# Patient Record
(demographics unavailable — no encounter records)

---

## 2024-10-10 NOTE — DISCUSSION/SUMMARY
[GA at Birth: ___] : GA at Birth: [unfilled] [Chronological Age: ___] : Chronological Age: [unfilled] [Alert] : alert [Irritable] : irritable [Vocalizes] : vocalizes [Consolable] : consolable [Quiet] : quiet [Turns head to both sides (0-2 months)] : turns head to both sides (0-2 months) [Moves extremities equally] : moves extremities equally [Moves against gravity] : moves against gravity [Turns head side to side] : turns head side to side [Active] : sidelying to supine (1.5 - 2 months) - Active [Passive] : prone to supine (2- 5 months) - Passive [Lag] : Head lag (0-2 months) - lag [Poor] : head control is poor [<] : < [Organized] : organized [Good] : good [Focusing (2 months)] : focusing (2 months) [Visual attention] : visual attention [] : no [Sleeps well] : sleeps well [Prone] : prone [Developmental Suggestions] : developmental suggestions handout [FreeTextEntry1] : ANA [FreeTextEntry2] : overall development [FreeTextEntry5] : prefers L rotation however able to rotate R [FreeTextEntry6] : decreased shoulder girdle strength, decreased midline orientation, hands fisted [FreeTextEntry3] : Dev Handout given to parents for supine, prone, sidelying, swaddle, vestibular. Instructed parents on importance of swaddling in flexed, midline posture. Educated parents on proper prone position and schedule as well as visual motor exercises. Demonstrates age appropriate state regulation skills. PT recommended due to head lag, low truncal tone, fixing (hands fisted). Follow up c  clinic.

## 2024-10-10 NOTE — REASON FOR VISIT
[Weight Check] : weight check [Developmental Delay] : developmental delay [Medical Records] : medical records [Parents] : parents [F/U - Hospitalization] : follow-up of a recent hospitalization for [Mother] : mother

## 2024-10-10 NOTE — REVIEW OF SYSTEMS
[Nl] : Allergy/Immunology [Immunizations are up to date] : Immunizations are up to date [RSV prophylaxis received] : RSV prophylaxis received

## 2024-10-10 NOTE — REVIEW OF SYSTEMS
No respiratory distress. No stridor, Lungs sounds clear with good aeration bilaterally. [Nl] : Allergy/Immunology [Immunizations are up to date] : Immunizations are up to date [RSV prophylaxis received] : RSV prophylaxis received

## 2024-10-10 NOTE — DISCUSSION/SUMMARY
[GA at Birth: ___] : GA at Birth: [unfilled] [Chronological Age: ___] : Chronological Age: [unfilled] [Alert] : alert [Irritable] : irritable [Vocalizes] : vocalizes [Consolable] : consolable [Quiet] : quiet [Turns head to both sides (0-2 months)] : turns head to both sides (0-2 months) [Moves extremities equally] : moves extremities equally [Moves against gravity] : moves against gravity [Turns head side to side] : turns head side to side [Active] : sidelying to supine (1.5 - 2 months) - Active [Passive] : prone to supine (2- 5 months) - Passive [Lag] : Head lag (0-2 months) - lag [Poor] : head control is poor [<] : < [Organized] : organized [Good] : good [Focusing (2 months)] : focusing (2 months) [Visual attention] : visual attention [] : yes [Sleeps well] : sleeps well [Prone] : prone [Developmental Suggestions] : developmental suggestions handout [FreeTextEntry1] : ANA [FreeTextEntry2] : overall development [FreeTextEntry5] : prefers L rotation however able to rotate R [FreeTextEntry6] : decreased shoulder girdle strength, decreased midline orientation, hands fisted [FreeTextEntry3] : Dev Handout given to parents for supine, prone, sidelying, swaddle, vestibular. Instructed parents on importance of swaddling in flexed, midline posture. Educated parents on proper prone position and schedule as well as visual motor exercises. Demonstrates age appropriate state regulation skills. PT recommended due to head lag, low truncal tone, fixing (hands fisted). Follow up c  clinic.

## 2024-10-11 NOTE — PHYSICAL EXAM
[Well-appearing] : well-appearing [Normocephalic] : normocephalic [Anterior fontanel- Open] : anterior fontanel- open [Anterior fontanel- Soft] : anterior fontanel- soft [Anterior fontanel- Flat] : anterior fontanel- flat [No dysmorphic facial features] : no dysmorphic facial features [No ocular abnormalities] : no ocular abnormalities [Neck supple] : neck supple [Soft] : soft [No organomegaly] : no organomegaly [No deformities] : no deformities [Alert] : alert [Regards] : regards [Smiling] : smiling [Pupils reactive to light] : pupils reactive to light [Turns to light] : turns to light [Tracks face, light or objects with full extraocular movements] : tracks face, light or objects with full extraocular movements [No facial asymmetry or weakness] : no facial asymmetry or weakness [No nystagmus] : no nystagmus [Responds to voice/sounds] : responds to voice/sounds [Midline tongue] : midline tongue [No fasciculations] : no fasciculations [Normal axial and appendicular muscle tone with symmetric limb movements] : normal axial and appendicular muscle tone with symmetric limb movements [Normal bulk] : normal bulk [No abnormal involuntary movements] : no abnormal involuntary movements [2+ biceps] : 2+ biceps [Knee jerks] : knee jerks [Ankle jerks] : ankle jerks [No ankle clonus] : no ankle clonus [Responds to touch and tickle] : responds to touch and tickle

## 2024-10-13 NOTE — PATIENT INSTRUCTIONS
[Verbal patient instructions provided] : Verbal patient instructions provided. [FreeTextEntry1] : Developmental Clinic appt     2/19/25     phone: [phone] Peds Neurology 10/11/24 Next NICU Grad Clinic appt 12/12/24 @ 8:45 [FreeTextEntry2] : Evaluated by PT. Exercises and positioning reviewed and tummy time reinforced.  Outpatient PT referral sent [FreeTextEntry3] : not needed at this time [FreeTextEntry4] : Continue Breastfeeding/expressed breast milk on demand [FreeTextEntry5] : Continue vitamin D [FreeTextEntry6] : n/a [FreeTextEntry7] : n/a [FreeTextEntry8] : per PMD [de-identified] : Eligible for beyfortus Fall 2024, please discuss with PMD. [FreeTextEntry9] : n/a [de-identified] : skin care instructions reviewed with caregiver, aquaphor to skin, avoid direct sun exposure [de-identified] : none [de-identified] : none

## 2024-10-13 NOTE — HISTORY OF PRESENT ILLNESS
[Gestational Age: ___] : Gestational Age: [unfilled] [Chronological Age: ___] : Chronological Age: [unfilled] [Date of D/C: ___] : Date of D/C: [unfilled] [Developmental Pediatrics: ___] : Developmental Pediatrics: [unfilled] [Car seat use according to directions] : car seat used according to directions [No Feeding Issues] : no feeding issues at this time [___ minutes/feeding] : [unfilled] minutes/feeding [Other ___] : [unfilled] [___ yovana/ounce] : [unfilled] yovana/ounce [___ ounces/feeding] : ~LEANNA buck/feeding [Every ___ hours] : every [unfilled] hours [_____ Times Per] : Stool frequency occurs [unfilled] times per  [Day] : day [Soft] : soft [Solid Foods] : no solid food at this time [Bloody] : not bloody [Mucousy] : no mucous [de-identified] :  High Risk & Developmental follow up [de-identified] : none [de-identified] : Peds Neuro 10/11/24 [FreeTextEntry3] : with supplementation of WGM8 [de-identified] : Sleeps supine in own bed.  Wakes to feed every 3-4 hrs [de-identified] : n/a

## 2024-10-13 NOTE — HISTORY OF PRESENT ILLNESS
[Gestational Age: ___] : Gestational Age: [unfilled] [Chronological Age: ___] : Chronological Age: [unfilled] [Date of D/C: ___] : Date of D/C: [unfilled] [Developmental Pediatrics: ___] : Developmental Pediatrics: [unfilled] [Car seat use according to directions] : car seat used according to directions [No Feeding Issues] : no feeding issues at this time [___ minutes/feeding] : [unfilled] minutes/feeding [Other ___] : [unfilled] [___ yovana/ounce] : [unfilled] yovana/ounce [___ ounces/feeding] : ~LEANNA buck/feeding [Every ___ hours] : every [unfilled] hours [_____ Times Per] : Stool frequency occurs [unfilled] times per  [Day] : day [Soft] : soft [Solid Foods] : no solid food at this time [Bloody] : not bloody [Mucousy] : no mucous [de-identified] :  High Risk & Developmental follow up [de-identified] : none [de-identified] : Peds Neuro 10/11/24 [FreeTextEntry3] : with supplementation of WGM8 [de-identified] : Sleeps supine in own bed.  Wakes to feed every 3-4 hrs [de-identified] : n/a

## 2024-10-13 NOTE — CONSULT LETTER
[Dear  ___] : Dear  [unfilled], [Courtesy Letter:] : I had the pleasure of seeing your patient, [unfilled], in my office today. [Please see my note below.] : Please see my note below. [Sincerely,] : Sincerely, [FreeTextEntry3] : Bertha Cee MD Attending Neonatologist [facility]

## 2024-10-13 NOTE — HISTORY OF PRESENT ILLNESS
[Gestational Age: ___] : Gestational Age: [unfilled] [Chronological Age: ___] : Chronological Age: [unfilled] [Date of D/C: ___] : Date of D/C: [unfilled] [Developmental Pediatrics: ___] : Developmental Pediatrics: [unfilled] [Car seat use according to directions] : car seat used according to directions [No Feeding Issues] : no feeding issues at this time [___ minutes/feeding] : [unfilled] minutes/feeding [Other ___] : [unfilled] [___ yovana/ounce] : [unfilled] yovana/ounce [___ ounces/feeding] : ~LEANNA buck/feeding [Every ___ hours] : every [unfilled] hours [_____ Times Per] : Stool frequency occurs [unfilled] times per  [Day] : day [Soft] : soft [Solid Foods] : no solid food at this time [Bloody] : not bloody [Mucousy] : no mucous [de-identified] :  High Risk & Developmental follow up [de-identified] : none [de-identified] : Peds Neuro 10/11/24 [FreeTextEntry3] : with supplementation of WGM8 [de-identified] : Sleeps supine in own bed.  Wakes to feed every 3-4 hrs [de-identified] : n/a

## 2024-10-13 NOTE — PHYSICAL EXAM
[Pink] : pink [Well Perfused] : well perfused [No Rashes] : no rashes [No Birth Marks] : no birth marks [Conjunctiva Clear] : conjunctiva clear [PERRL] : pupils were equal, round, reactive to light  [Ears Normal Position and Shape] : normal position and shape of ears [Nares Patent] : nares patent [No Nasal Flaring] : no nasal flaring [Moist and Pink Mucous Membranes] : moist and pink mucous membranes [Palate Intact] : palate intact [No Torticollis] : no torticollis [Symmetric Expansion] : symmetric chest expansion [No Retractions] : no retractions [Clear to Auscultation] : lungs clear to auscultation  [Normal S1, S2] : normal S1 and S2 [Regular Rhythm] : regular rhythm [No Murmur] : no mumur [Normal Pulses] : normal pulses [Non Distended] : non distended [No HSM] : no hepatosplenomegaly appreciated [No Masses] : no masses were palpated [Normal Bowel Sounds] : normal bowel sounds [No Umbilical Hernia] : no umbilical hernia [Normal Genitalia] : normal genitalia [No Sacral Dimples] : no sacral dimples [No Scoliosis] : no scoliosis [Normal Range of Motion] : normal range of motion [Normal Posture] : normal posture [No evidence of Hip Dislocation] : no evidence of hip dislocation [Active and Alert] : active and alert [Normal deep tendon reflexes] : normal deep tendon reflexes [Strong Suck] : the strong sucking reflex was ~L present [Fixes On Faces] : fixes on faces [Follows 180 Degrees] : visual track 180 degrees [Smiles Sociallly] : has a social smile [[place]] : coos [Turns Head Side to Side in Prone] : turns head side to side in prone [Lifts Head And Chest 45 degress in Prone] : lifts the head and chest 45 degress in prone [Weight Shifts in Prone] : weight shifts in prone [Rolls Front to Back] : rolls front to back [Reaches for Objects] : reaches for objects [Brings Hands to Mouth] : brings hands to mouth [Brings Hands to Midline] : brings hands to midline [FreeTextEntry3] : tiny palpable but movable lymph nodes in neck [de-identified] : mild hypertonia of shoulders and upper arms and + head lag Mild hyportonia of trunk

## 2024-10-13 NOTE — BIRTH HISTORY
[de-identified] : Baby is a 39.2 wk female born to a 35 y/o  mother via unscheduled emergent C/S for fetal bradycardia for ~ 10 min with HR dropping to 90's with intermittent recovery but not sustained during pushing and elevated station of 0. Maternal history significant for chronic HTN on labetalol and amlodipine for which she was undergoing IOL. Prenatal history uncomplicated. Maternal blood type B+. PNL: HIV neg/RPR NR/HBsAg neg/rubella immune. GBS positive on , inadequate treatment with Cefazolin. AROM at 12:50 on , light mec fluids. ROM duration 16 HRS. Maternal temp 37.1C. EOS: 0.32. Baby born vertex, immediate cord clamping, poor tone, blue, apneic. Warmed, dried, stimulated without improvement and PPV started at 1 min of life. HR<60 at 1 MOL. PIP increased to 30 due to poor chest rise, PEEP of 6 up to 100% FiO2. HR improved 100 by 3 MOL, however due to poor respiratory effort, infant was intubated with 3.5 ETT by NICU fellow Dr. Coburn on the first attempt. Equal but coarse breath sounds noted. PIP weaned to 25 and FiO2 to 40% prior to leaving OR. Apgars 1/2/6/8.  [de-identified] : FT, AGA, respiratory failure, need for observation for sepsis, metabolic acidosis, and  encephalopathy

## 2024-10-13 NOTE — PHYSICAL EXAM
[Pink] : pink [Well Perfused] : well perfused [No Rashes] : no rashes [No Birth Marks] : no birth marks [Conjunctiva Clear] : conjunctiva clear [PERRL] : pupils were equal, round, reactive to light  [Ears Normal Position and Shape] : normal position and shape of ears [Nares Patent] : nares patent [No Nasal Flaring] : no nasal flaring [Moist and Pink Mucous Membranes] : moist and pink mucous membranes [Palate Intact] : palate intact [No Torticollis] : no torticollis [Symmetric Expansion] : symmetric chest expansion [No Retractions] : no retractions [Clear to Auscultation] : lungs clear to auscultation  [Normal S1, S2] : normal S1 and S2 [Regular Rhythm] : regular rhythm [No Murmur] : no mumur [Normal Pulses] : normal pulses [Non Distended] : non distended [No HSM] : no hepatosplenomegaly appreciated [No Masses] : no masses were palpated [Normal Bowel Sounds] : normal bowel sounds [No Umbilical Hernia] : no umbilical hernia [Normal Genitalia] : normal genitalia [No Sacral Dimples] : no sacral dimples [No Scoliosis] : no scoliosis [Normal Range of Motion] : normal range of motion [Normal Posture] : normal posture [No evidence of Hip Dislocation] : no evidence of hip dislocation [Active and Alert] : active and alert [Normal deep tendon reflexes] : normal deep tendon reflexes [Strong Suck] : the strong sucking reflex was ~L present [Fixes On Faces] : fixes on faces [Follows 180 Degrees] : visual track 180 degrees [Smiles Sociallly] : has a social smile [[place]] : coos [Turns Head Side to Side in Prone] : turns head side to side in prone [Lifts Head And Chest 45 degress in Prone] : lifts the head and chest 45 degress in prone [Weight Shifts in Prone] : weight shifts in prone [Rolls Front to Back] : rolls front to back [Reaches for Objects] : reaches for objects [Brings Hands to Mouth] : brings hands to mouth [Brings Hands to Midline] : brings hands to midline [FreeTextEntry3] : tiny palpable but movable lymph nodes in neck [de-identified] : mild hypertonia of shoulders and upper arms and + head lag Mild hyportonia of trunk

## 2024-10-13 NOTE — BIRTH HISTORY
[de-identified] : Baby is a 39.2 wk female born to a 33 y/o  mother via unscheduled emergent C/S for fetal bradycardia for ~ 10 min with HR dropping to 90's with intermittent recovery but not sustained during pushing and elevated station of 0. Maternal history significant for chronic HTN on labetalol and amlodipine for which she was undergoing IOL. Prenatal history uncomplicated. Maternal blood type B+. PNL: HIV neg/RPR NR/HBsAg neg/rubella immune. GBS positive on , inadequate treatment with Cefazolin. AROM at 12:50 on , light mec fluids. ROM duration 16 HRS. Maternal temp 37.1C. EOS: 0.32. Baby born vertex, immediate cord clamping, poor tone, blue, apneic. Warmed, dried, stimulated without improvement and PPV started at 1 min of life. HR<60 at 1 MOL. PIP increased to 30 due to poor chest rise, PEEP of 6 up to 100% FiO2. HR improved 100 by 3 MOL, however due to poor respiratory effort, infant was intubated with 3.5 ETT by NICU fellow Dr. Coburn on the first attempt. Equal but coarse breath sounds noted. PIP weaned to 25 and FiO2 to 40% prior to leaving OR. Apgars 1/2/6/8.  [de-identified] : FT, AGA, respiratory failure, need for observation for sepsis, metabolic acidosis, and  encephalopathy

## 2024-10-13 NOTE — PATIENT INSTRUCTIONS
[Verbal patient instructions provided] : Verbal patient instructions provided. [FreeTextEntry1] : Developmental Clinic appt     2/19/25     phone: [phone] Peds Neurology 10/11/24 Next NICU Grad Clinic appt 12/12/24 @ 8:45 [FreeTextEntry2] : Evaluated by PT. Exercises and positioning reviewed and tummy time reinforced.  Outpatient PT referral sent [FreeTextEntry3] : not needed at this time [FreeTextEntry4] : Continue Breastfeeding/expressed breast milk on demand [FreeTextEntry5] : Continue vitamin D [FreeTextEntry6] : n/a [FreeTextEntry7] : n/a [FreeTextEntry8] : per PMD [de-identified] : Eligible for beyfortus Fall 2024, please discuss with PMD. [FreeTextEntry9] : n/a [de-identified] : skin care instructions reviewed with caregiver, aquaphor to skin, avoid direct sun exposure [de-identified] : none [de-identified] : none

## 2024-10-13 NOTE — CONSULT LETTER
[Dear  ___] : Dear  [unfilled], [Courtesy Letter:] : I had the pleasure of seeing your patient, [unfilled], in my office today. [Please see my note below.] : Please see my note below. [Sincerely,] : Sincerely, [FreeTextEntry3] : Bertha Cee MD Attending Neonatologist [facility]

## 2024-10-13 NOTE — HISTORY OF PRESENT ILLNESS
[Gestational Age: ___] : Gestational Age: [unfilled] [Chronological Age: ___] : Chronological Age: [unfilled] [Date of D/C: ___] : Date of D/C: [unfilled] [Developmental Pediatrics: ___] : Developmental Pediatrics: [unfilled] [Car seat use according to directions] : car seat used according to directions [No Feeding Issues] : no feeding issues at this time [___ minutes/feeding] : [unfilled] minutes/feeding [Other ___] : [unfilled] [___ yovana/ounce] : [unfilled] yovana/ounce [___ ounces/feeding] : ~LEANNA buck/feeding [Every ___ hours] : every [unfilled] hours [_____ Times Per] : Stool frequency occurs [unfilled] times per  [Day] : day [Soft] : soft [Solid Foods] : no solid food at this time [Bloody] : not bloody [Mucousy] : no mucous [de-identified] :  High Risk & Developmental follow up [de-identified] : none [de-identified] : Peds Neuro 10/11/24 [FreeTextEntry3] : with supplementation of WGM8 [de-identified] : Sleeps supine in own bed.  Wakes to feed every 3-4 hrs [de-identified] : n/a

## 2024-10-13 NOTE — PHYSICAL EXAM
[Pink] : pink [Well Perfused] : well perfused [No Rashes] : no rashes [No Birth Marks] : no birth marks [Conjunctiva Clear] : conjunctiva clear [PERRL] : pupils were equal, round, reactive to light  [Ears Normal Position and Shape] : normal position and shape of ears [Nares Patent] : nares patent [No Nasal Flaring] : no nasal flaring [Moist and Pink Mucous Membranes] : moist and pink mucous membranes [Palate Intact] : palate intact [No Torticollis] : no torticollis [Symmetric Expansion] : symmetric chest expansion [No Retractions] : no retractions [Clear to Auscultation] : lungs clear to auscultation  [Normal S1, S2] : normal S1 and S2 [Regular Rhythm] : regular rhythm [No Murmur] : no mumur [Normal Pulses] : normal pulses [Non Distended] : non distended [No HSM] : no hepatosplenomegaly appreciated [No Masses] : no masses were palpated [Normal Bowel Sounds] : normal bowel sounds [No Umbilical Hernia] : no umbilical hernia [Normal Genitalia] : normal genitalia [No Sacral Dimples] : no sacral dimples [No Scoliosis] : no scoliosis [Normal Range of Motion] : normal range of motion [Normal Posture] : normal posture [No evidence of Hip Dislocation] : no evidence of hip dislocation [Active and Alert] : active and alert [Normal deep tendon reflexes] : normal deep tendon reflexes [Strong Suck] : the strong sucking reflex was ~L present [Fixes On Faces] : fixes on faces [Follows 180 Degrees] : visual track 180 degrees [Smiles Sociallly] : has a social smile [[place]] : coos [Turns Head Side to Side in Prone] : turns head side to side in prone [Lifts Head And Chest 45 degress in Prone] : lifts the head and chest 45 degress in prone [Weight Shifts in Prone] : weight shifts in prone [Rolls Front to Back] : rolls front to back [Reaches for Objects] : reaches for objects [Brings Hands to Mouth] : brings hands to mouth [Brings Hands to Midline] : brings hands to midline [FreeTextEntry3] : tiny palpable but movable lymph nodes in neck [de-identified] : mild hypertonia of shoulders and upper arms and + head lag Mild hyportonia of trunk

## 2024-10-13 NOTE — CONSULT LETTER
[Dear  ___] : Dear  [unfilled], [Courtesy Letter:] : I had the pleasure of seeing your patient, [unfilled], in my office today. [Please see my note below.] : Please see my note below. [Sincerely,] : Sincerely, [FreeTextEntry3] : Bertha Cee MD Attending Neonatologist [facility]

## 2024-10-13 NOTE — PATIENT INSTRUCTIONS
[Verbal patient instructions provided] : Verbal patient instructions provided. [FreeTextEntry1] : Developmental Clinic appt     2/19/25     phone: [phone] Peds Neurology 10/11/24 Next NICU Grad Clinic appt 12/12/24 @ 8:45 [FreeTextEntry2] : Evaluated by PT. Exercises and positioning reviewed and tummy time reinforced.  Outpatient PT referral sent [FreeTextEntry3] : not needed at this time [FreeTextEntry4] : Continue Breastfeeding/expressed breast milk on demand [FreeTextEntry5] : Continue vitamin D [FreeTextEntry6] : n/a [FreeTextEntry7] : n/a [FreeTextEntry8] : per PMD [de-identified] : Eligible for beyfortus Fall 2024, please discuss with PMD. [FreeTextEntry9] : n/a [de-identified] : skin care instructions reviewed with caregiver, aquaphor to skin, avoid direct sun exposure [de-identified] : none [de-identified] : none

## 2024-10-13 NOTE — END OF VISIT
[Time Spent: ___ minutes] : I have spent [unfilled] minutes of time on the encounter which excludes teaching and separately reported services. DISPLAY PLAN FREE TEXT

## 2024-10-13 NOTE — CONSULT LETTER
[Dear  ___] : Dear  [unfilled], [Courtesy Letter:] : I had the pleasure of seeing your patient, [unfilled], in my office today. [Please see my note below.] : Please see my note below. [Sincerely,] : Sincerely, [FreeTextEntry3] : Bertha Cee MD Attending Neonatologist [facility]

## 2024-10-13 NOTE — ASSESSMENT
[FreeTextEntry1] : ALLEN ANTONIO  is a 39.2 week gestation infant, now chronologic age 6 weeks seen in  follow-up. Pertinent NICU history includes Term infant, HIE S/P Therapeutic Hypothermia, Respiratory Failure, Metabolic Acidosis, Presumed Sepsis  The following issues were addressed at this visit.  Growth and nutrition: Weight gain has been 32 g.day and plots at the 31st percentile for corrected age.  Head growth and length are at the 56th and 13th percentiles respectively.  Baby is currently directly breastfeeding or receiving EHM  and the plan is to continue until the baby transitions to whole milk at ~ 1 year. Solid foods are not recommended until baby has good head control. .No labs needed today.. Continue vitamin supplements.  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  During her NICU stay her EEG showed mild-mod slowing of background activity but no sz.  Her MRI showed patchy areas of recent ischemia involving the inferior parietal periventricular white matter.  Will follow up Peds Neurology on 10/11. The baby has developmental delay for chronologic age, was seen by PT/OT today and given home exercises to do. Baby also has hypertonia of shoulders and UE, head lag, and mild hypotonia of the trunk. Early Intervention was not recommended but a referral was made for outpatient PT.  Baby will follow-up with pediatric developmental on 24 and will be seen back in the NICU Grad Clinic in 2 months.  Anemia: Baby has not needed iron supplements. Hct reviewed and is appropriate for age.  CLD: Infant has no sx of chronic lung disease. O2 sats 100% in RA. The baby received RSV prophylaxis by PMD on 10/9/.24.  JUAN: Baby has no signs of JUAN.  ROP: Baby is not at risk for ROP.  Other:   Health maintenance: Reviewed routine vaccination schedule with parent as well as guidance for flu vaccine for family, COVID-19 precautions, and need for PMD f/u.  Also discussed bathing and skin care recommendations.   Reviewed Hospital notes and Discharge Summary.   Next neonatology f/u: 25 @ 0845.

## 2024-10-13 NOTE — BIRTH HISTORY
[de-identified] : Baby is a 39.2 wk female born to a 33 y/o  mother via unscheduled emergent C/S for fetal bradycardia for ~ 10 min with HR dropping to 90's with intermittent recovery but not sustained during pushing and elevated station of 0. Maternal history significant for chronic HTN on labetalol and amlodipine for which she was undergoing IOL. Prenatal history uncomplicated. Maternal blood type B+. PNL: HIV neg/RPR NR/HBsAg neg/rubella immune. GBS positive on , inadequate treatment with Cefazolin. AROM at 12:50 on , light mec fluids. ROM duration 16 HRS. Maternal temp 37.1C. EOS: 0.32. Baby born vertex, immediate cord clamping, poor tone, blue, apneic. Warmed, dried, stimulated without improvement and PPV started at 1 min of life. HR<60 at 1 MOL. PIP increased to 30 due to poor chest rise, PEEP of 6 up to 100% FiO2. HR improved 100 by 3 MOL, however due to poor respiratory effort, infant was intubated with 3.5 ETT by NICU fellow Dr. Coburn on the first attempt. Equal but coarse breath sounds noted. PIP weaned to 25 and FiO2 to 40% prior to leaving OR. Apgars 1/2/6/8.  [de-identified] : FT, AGA, respiratory failure, need for observation for sepsis, metabolic acidosis, and  encephalopathy

## 2024-10-13 NOTE — PHYSICAL EXAM
[Pink] : pink [Well Perfused] : well perfused [No Rashes] : no rashes [No Birth Marks] : no birth marks [Conjunctiva Clear] : conjunctiva clear [PERRL] : pupils were equal, round, reactive to light  [Ears Normal Position and Shape] : normal position and shape of ears [Nares Patent] : nares patent [No Nasal Flaring] : no nasal flaring [Moist and Pink Mucous Membranes] : moist and pink mucous membranes [Palate Intact] : palate intact [No Torticollis] : no torticollis [Symmetric Expansion] : symmetric chest expansion [No Retractions] : no retractions [Clear to Auscultation] : lungs clear to auscultation  [Normal S1, S2] : normal S1 and S2 [Regular Rhythm] : regular rhythm [No Murmur] : no mumur [Normal Pulses] : normal pulses [Non Distended] : non distended [No HSM] : no hepatosplenomegaly appreciated [No Masses] : no masses were palpated [Normal Bowel Sounds] : normal bowel sounds [No Umbilical Hernia] : no umbilical hernia [Normal Genitalia] : normal genitalia [No Sacral Dimples] : no sacral dimples [No Scoliosis] : no scoliosis [Normal Range of Motion] : normal range of motion [Normal Posture] : normal posture [No evidence of Hip Dislocation] : no evidence of hip dislocation [Active and Alert] : active and alert [Normal deep tendon reflexes] : normal deep tendon reflexes [Strong Suck] : the strong sucking reflex was ~L present [Fixes On Faces] : fixes on faces [Follows 180 Degrees] : visual track 180 degrees [Smiles Sociallly] : has a social smile [[place]] : coos [Turns Head Side to Side in Prone] : turns head side to side in prone [Lifts Head And Chest 45 degress in Prone] : lifts the head and chest 45 degress in prone [Weight Shifts in Prone] : weight shifts in prone [Rolls Front to Back] : rolls front to back [Reaches for Objects] : reaches for objects [Brings Hands to Mouth] : brings hands to mouth [Brings Hands to Midline] : brings hands to midline [FreeTextEntry3] : tiny palpable but movable lymph nodes in neck [de-identified] : mild hypertonia of shoulders and upper arms and + head lag Mild hyportonia of trunk

## 2024-10-13 NOTE — BIRTH HISTORY
[de-identified] : Baby is a 39.2 wk female born to a 33 y/o  mother via unscheduled emergent C/S for fetal bradycardia for ~ 10 min with HR dropping to 90's with intermittent recovery but not sustained during pushing and elevated station of 0. Maternal history significant for chronic HTN on labetalol and amlodipine for which she was undergoing IOL. Prenatal history uncomplicated. Maternal blood type B+. PNL: HIV neg/RPR NR/HBsAg neg/rubella immune. GBS positive on , inadequate treatment with Cefazolin. AROM at 12:50 on , light mec fluids. ROM duration 16 HRS. Maternal temp 37.1C. EOS: 0.32. Baby born vertex, immediate cord clamping, poor tone, blue, apneic. Warmed, dried, stimulated without improvement and PPV started at 1 min of life. HR<60 at 1 MOL. PIP increased to 30 due to poor chest rise, PEEP of 6 up to 100% FiO2. HR improved 100 by 3 MOL, however due to poor respiratory effort, infant was intubated with 3.5 ETT by NICU fellow Dr. Coburn on the first attempt. Equal but coarse breath sounds noted. PIP weaned to 25 and FiO2 to 40% prior to leaving OR. Apgars 1/2/6/8.  [de-identified] : FT, AGA, respiratory failure, need for observation for sepsis, metabolic acidosis, and  encephalopathy

## 2024-10-13 NOTE — PATIENT INSTRUCTIONS
[Verbal patient instructions provided] : Verbal patient instructions provided. [FreeTextEntry1] : Developmental Clinic appt     2/19/25     phone: [phone] Peds Neurology 10/11/24 Next NICU Grad Clinic appt 12/12/24 @ 8:45 [FreeTextEntry2] : Evaluated by PT. Exercises and positioning reviewed and tummy time reinforced.  Outpatient PT referral sent [FreeTextEntry3] : not needed at this time [FreeTextEntry4] : Continue Breastfeeding/expressed breast milk on demand [FreeTextEntry5] : Continue vitamin D [FreeTextEntry6] : n/a [FreeTextEntry7] : n/a [FreeTextEntry8] : per PMD [de-identified] : Eligible for beyfortus Fall 2024, please discuss with PMD. [FreeTextEntry9] : n/a [de-identified] : skin care instructions reviewed with caregiver, aquaphor to skin, avoid direct sun exposure [de-identified] : none [de-identified] : none

## 2024-10-15 NOTE — CONSULT LETTER
[Dear  ___] : Dear  [unfilled], [Consult Letter:] : I had the pleasure of evaluating your patient, [unfilled]. [Please see my note below.] : Please see my note below. [Consult Closing:] : Thank you very much for allowing me to participate in the care of this patient.  If you have any questions, please do not hesitate to contact me. [Sincerely,] : Sincerely, [FreeTextEntry3] : Melisa Candelaria MD Child Neurologist [address], [address] [place] [address] Phone: [phone]

## 2024-10-15 NOTE — HISTORY OF PRESENT ILLNESS
[FreeTextEntry1] : ALLEN ANTONIO is a 1 month old girl with history of HIE s/p cooling here for follow up.   She was born at 39.2 wk female born to a 35 y/o  mother via unscheduled emergent C/S for fetal bradycardia for ~ 10 min with HR dropping to 90's with intermittent recovery but not sustained during pushing and elevated station of 0. Maternal history significant for chronic HTN on labetalol and amlodipine for which she was undergoing IOL. Prenatal history uncomplicated. Maternal blood type B+. PNL: HIV neg/RPR NR/HBsAg neg/rubella immune. GBS positive on , inadequate treatment with Cefazolin. AROM at 12:50 on , light mec fluids. ROM duration 16 HRS. Baby born vertex, immediate cord clamping, poor tone, blue, apneic. Warmed, dried, stimulated without improvement and PPV started at 1 min of life.  Apgars 1/2/6/8. Baby noted to be hypotonic with borderline metabolic acidosis on cord gases (7.00/-12.9 on arterial and 7.06/-12.2 on venous).  On admission to NICU noted to have tongue thrusting and rhythmic mandibular movements concerning for possible seizure.  VEEG showed excessive sharp delta and suppressed IBI (mild-moderate cerebral dysfunction), no seizures. MRI Head and Spec () showed patchy small subcentimeter areas of recent ischemia involve the inferior parietal periventricular white matter. Patchy areas of subacute petechial hemorrhagic transformation involve the areas of ischemia, without evidence for hematoma formation.  Today is her follow up visit.  She is feeding well.  +tracks and smiles.  No concerns at this time.  Was referred by NICU for PT, OT.

## 2024-10-15 NOTE — HISTORY OF PRESENT ILLNESS
[FreeTextEntry1] : ALLEN ANTONIO is a 1 month old girl with history of HIE s/p cooling here for follow up.   She was born at 39.2 wk female born to a 33 y/o  mother via unscheduled emergent C/S for fetal bradycardia for ~ 10 min with HR dropping to 90's with intermittent recovery but not sustained during pushing and elevated station of 0. Maternal history significant for chronic HTN on labetalol and amlodipine for which she was undergoing IOL. Prenatal history uncomplicated. Maternal blood type B+. PNL: HIV neg/RPR NR/HBsAg neg/rubella immune. GBS positive on , inadequate treatment with Cefazolin. AROM at 12:50 on , light mec fluids. ROM duration 16 HRS. Baby born vertex, immediate cord clamping, poor tone, blue, apneic. Warmed, dried, stimulated without improvement and PPV started at 1 min of life.  Apgars 1/2/6/8. Baby noted to be hypotonic with borderline metabolic acidosis on cord gases (7.00/-12.9 on arterial and 7.06/-12.2 on venous).  On admission to NICU noted to have tongue thrusting and rhythmic mandibular movements concerning for possible seizure.  VEEG showed excessive sharp delta and suppressed IBI (mild-moderate cerebral dysfunction), no seizures. MRI Head and Spec () showed patchy small subcentimeter areas of recent ischemia involve the inferior parietal periventricular white matter. Patchy areas of subacute petechial hemorrhagic transformation involve the areas of ischemia, without evidence for hematoma formation.  Today is her follow up visit.  She is feeding well.  +tracks and smiles.  No concerns at this time.  Was referred by NICU for PT, OT.

## 2024-10-15 NOTE — ASSESSMENT
[FreeTextEntry1] : 45 day old female with HIE s/p cooling here for follow up.  MRI showed mild PVL.  No seizures recorded on hospital VEEG.

## 2024-10-15 NOTE — CONSULT LETTER
[Dear  ___] : Dear  [unfilled], [Consult Letter:] : I had the pleasure of evaluating your patient, [unfilled]. [Please see my note below.] : Please see my note below. [Consult Closing:] : Thank you very much for allowing me to participate in the care of this patient.  If you have any questions, please do not hesitate to contact me. [Sincerely,] : Sincerely, [FreeTextEntry3] : Melisa Candelaria MD Child Neurologist [address], [address] [place] [address] Phone: [phone]

## 2024-12-12 NOTE — DISCUSSION/SUMMARY
[GA at Birth: ___] : GA at Birth: [unfilled] [Chronological Age: ___] : Chronological Age: [unfilled] [Alert] : alert [Vocalizes] : vocalizes [Consolable] : consolable [Social/Interactive] : social/interactive [Playful face to face inter  w/ people] : playful face to face interacts with people [[place] in resp to playful interaction] : coos in response to playful interaction [Head in midline] : head in midline [Hands to midline] : hands to midline [Moves extremities against gravity] : moves extremities against gravity [Chin tuck] : chin tuck [Swats at toy] : swats at toy [Reaches to grab toy both hands equally] : reaches to grab toy both hands equally [Turns head side to side] : turns head side to side [Picks up head and props on elbows] : picks up head and props on elbows [Assist] : prone to supine (2-5 months) - Assist [Good] : head control is good [Ribs] : at ribs [Gross Grasp] : gross grasp [Explores with mouth] : explores with mouth [Release] : release [Hands to mouth] : hands to mouth [>] : > [Tracking moving objects (4-7 months)] : tracking moving objects (4-7 months) [Loves] : loves [Enjoy variety of textures] : enjoys variety of textures [Enjoy musical toys] : enjoys musical toys [Uses hands to play w/ and explore toys] : uses hands to play with and explore toys [] : no [Sleeps well] : sleeps well [Maintains eye contact with family during palyful interaction] : maintains eye contact with family during playful interaction [Enjoys playful interaction with other] : enjoys playful interaction with others [Comforted by cuddling or parents touch] : comforted by cuddling or parents touch [Generally happy when all needs met] : generally happy when all needs are met [Enjoys variety of playful movement (swing, bouncing)] : enjoys variety of playful movement (swing, bouncing) [Prone] : prone [Sitting] : sitting [FreeTextEntry1] : ANA [FreeTextEntry2] : went to outpt PT 1x.  [FreeTextEntry5] : Decreased right lateral neck tilt [FreeTextEntry3] : Parents instructed on activities to promote midline orientation of UE in all positions.

## 2024-12-12 NOTE — REASON FOR VISIT
[Follow-Up] : a follow-up visit for [Weight Check] : weight check [Developmental Delay] : developmental delay [Medical Records] : medical records [Mother] : mother [Father] : father [Parents] : parents [FreeTextEntry3] : ANA

## 2024-12-12 NOTE — HISTORY OF PRESENT ILLNESS
[Gestational Age: ___] : Gestational Age: [unfilled] [Chronological Age: ___] : Chronological Age: [unfilled] [Date of D/C: ___] : Date of D/C: [unfilled] [Developmental Pediatrics: ___] : Developmental Pediatrics: [unfilled] [_____ Times Per] : Stool frequency occurs [unfilled] times per  [Day] : day [Soft] : soft [Formed] : formed [No Feeding Issues] : no feeding issues at this time [Bloody] : not bloody [Mucousy] : no mucous [de-identified] :  High Risk & Developmental follow up [de-identified] : none [de-identified] : Peds Neuro, follow up in April 25 [de-identified] : done [de-identified] : 70-120ml every 3-4 hours, throughout night as well. [de-identified] : n/a

## 2024-12-12 NOTE — HISTORY OF PRESENT ILLNESS
[Gestational Age: ___] : Gestational Age: [unfilled] [Chronological Age: ___] : Chronological Age: [unfilled] [Date of D/C: ___] : Date of D/C: [unfilled] [Developmental Pediatrics: ___] : Developmental Pediatrics: [unfilled] [_____ Times Per] : Stool frequency occurs [unfilled] times per  [Day] : day [Soft] : soft [Formed] : formed [No Feeding Issues] : no feeding issues at this time [Bloody] : not bloody [Mucousy] : no mucous [de-identified] :  High Risk & Developmental follow up [de-identified] : none [de-identified] : Peds Neuro, follow up in April 25 [de-identified] : done [de-identified] : 70-120ml every 3-4 hours, throughout night as well. [de-identified] : n/a

## 2024-12-12 NOTE — CONSULT LETTER
[Dear  ___] : Dear  [unfilled], [Courtesy Letter:] : I had the pleasure of seeing your patient, [unfilled], in my office today. [Please see my note below.] : Please see my note below. [Sincerely,] : Sincerely, [FreeTextEntry3] : Bertha Cee MD Attending Neonatologist [facility]

## 2024-12-12 NOTE — PATIENT INSTRUCTIONS
[Verbal patient instructions provided] : Verbal patient instructions provided. [de-identified] : Eligible for beyfortus Fall 2024, please discuss with PMD. [FreeTextEntry1] : Developmental Clinic appt   2/19/25     phone: [phone] Neuro f/u April 2025 [FreeTextEntry4] : Continue vitamin D. No solids until 5-6 months [FreeTextEntry6] : n/a [FreeTextEntry7] : n/a [FreeTextEntry8] : per PMD [FreeTextEntry9] : n/a [de-identified] : skin care instructions reviewed with caregiver, aquaphor to skin, avoid direct sun exposure

## 2024-12-12 NOTE — ASSESSMENT
[FreeTextEntry1] : ALLEN ANTONIO is a 39.2 week gestation infant, now chronologic age 6 weeks seen in  follow-up. Pertinent NICU history includes Term infant, HIE S/P Therapeutic Hypothermia, Respiratory Failure, Metabolic Acidosis, Presumed Sepsis  The following issues were addressed at this visit.  Growth and nutrition: Weight gain has been 28 g.day and plots at [id number] percentile. Head growth and length are at the 19th and 18th percentiles respectively. Baby is currently directly breastfeeding or receiving EHM and the plan is to continue until the baby transitions to whole milk at ~ 1 year. Solid foods are not recommended until baby has good head control. No labs needed today. Continue vitamin supplements.  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 During her NICU stay her EEG showed mild-mod slowing of background activity but no sz. Her MRI showed patchy areas of recent ischemia involving the inferior parietal periventricular white matter. Saw Peds Neurology on 10/11,  next follow up in 2025. The baby has developmental delay for chronologic age, was seen by PT/OT today and given home exercises to do. Baby is developing appropriately as of now, does not need additional PT/OT. Last visit, Early Intervention was not recommended but patient saw outpatient PT, who gave exercises and stretches to do at home. Baby will follow-up with pediatric developmental on 24.  Anemia: Baby has not needed iron supplements. Hct reviewed and is appropriate for age.  CLD: Infant has no sx of chronic lung disease. O2 sats 100% in RA. The baby received RSV prophylaxis by PMD on 10/9/.24.  JUAN: Baby has no signs of JUAN.  ROP: Baby is not at risk for ROP.  Other: Health maintenance: Reviewed routine vaccination schedule with parent as well as guidance for flu vaccine for family, COVID-19 precautions, and need for PMD f/u. Also discussed bathing and skin care recommendations.   Reviewed Hospital notes and Discharge Summary.  Patient qualifies for Bert assessment at 1 year of age.

## 2024-12-12 NOTE — PHYSICAL EXAM
[Pink] : pink [Well Perfused] : well perfused [No Rashes] : no rashes [No Birth Marks] : no birth marks [Conjunctiva Clear] : conjunctiva clear [PERRL] : pupils were equal, round, reactive to light  [Ears Normal Position and Shape] : normal position and shape of ears [Nares Patent] : nares patent [No Nasal Flaring] : no nasal flaring [Moist and Pink Mucous Membranes] : moist and pink mucous membranes [Palate Intact] : palate intact [No Torticollis] : no torticollis [No Neck Masses] : no neck masses [Symmetric Expansion] : symmetric chest expansion [No Retractions] : no retractions [Clear to Auscultation] : lungs clear to auscultation  [Normal S1, S2] : normal S1 and S2 [Regular Rhythm] : regular rhythm [No Murmur] : no mumur [Normal Pulses] : normal pulses [Non Distended] : non distended [No HSM] : no hepatosplenomegaly appreciated [No Masses] : no masses were palpated [Normal Bowel Sounds] : normal bowel sounds [No Umbilical Hernia] : no umbilical hernia [Normal Genitalia] : normal genitalia [No Sacral Dimples] : no sacral dimples [No Scoliosis] : no scoliosis [Normal Range of Motion] : normal range of motion [Normal Posture] : normal posture [No evidence of Hip Dislocation] : no evidence of hip dislocation [Active and Alert] : active and alert [Normal muscle tone] : normal muscle tone of all extremites [Normal truncal tone] : normal truncal tone [Normal deep tendon reflexes] : normal deep tendon reflexes [No head lag] : no head lag [Symmetric Jeri] : the [place] reflex was ~L present [Strong Suck] : the strong sucking reflex was ~L present [Rooting] : the rooting reflex was ~L present [Fixes On Faces] : fixes on faces [Follows 180 Degrees] : visual track 180 degrees [Smiles Sociallly] : has a social smile [Lifts Head And Chest 45 degress in Prone] : lifts the head and chest 45 degress in prone [Weight Shifts in Prone] : weight shifts in prone [Sits With Support] : sits with support [Hands Open] : the hands open [Reaches for Objects] : reaches for objects [Transfers Objects] : transfers objects from hand to hand [Brings Hands to Mouth] : brings hands to mouth [Reaches For Objects in Prone] : does not reach for objects in prone

## 2024-12-12 NOTE — HISTORY OF PRESENT ILLNESS
[Gestational Age: ___] : Gestational Age: [unfilled] [Chronological Age: ___] : Chronological Age: [unfilled] [Date of D/C: ___] : Date of D/C: [unfilled] [Developmental Pediatrics: ___] : Developmental Pediatrics: [unfilled] [_____ Times Per] : Stool frequency occurs [unfilled] times per  [Day] : day [Soft] : soft [Formed] : formed [No Feeding Issues] : no feeding issues at this time [Bloody] : not bloody [Mucousy] : no mucous [de-identified] :  High Risk & Developmental follow up [de-identified] : none [de-identified] : Peds Neuro, follow up in April 25 [de-identified] : done [de-identified] : 70-120ml every 3-4 hours, throughout night as well. [de-identified] : n/a

## 2024-12-12 NOTE — CONSULT LETTER
[Dear  ___] : Dear  [unfilled], [Courtesy Letter:] : I had the pleasure of seeing your patient, [unfilled], in my office today. [Please see my note below.] : Please see my note below. [Sincerely,] : Sincerely, [FreeTextEntry3] : Bertha Cee MD Attending Neonatologist [facility]

## 2024-12-12 NOTE — PATIENT INSTRUCTIONS
[Verbal patient instructions provided] : Verbal patient instructions provided. [de-identified] : Eligible for beyfortus Fall 2024, please discuss with PMD. [FreeTextEntry1] : Developmental Clinic appt   2/19/25     phone: [phone] Neuro f/u April 2025 [FreeTextEntry4] : Continue vitamin D. No solids until 5-6 months [FreeTextEntry6] : n/a [FreeTextEntry7] : n/a [FreeTextEntry8] : per PMD [FreeTextEntry9] : n/a [de-identified] : skin care instructions reviewed with caregiver, aquaphor to skin, avoid direct sun exposure

## 2024-12-12 NOTE — DISCUSSION/SUMMARY
[GA at Birth: ___] : GA at Birth: [unfilled] [Chronological Age: ___] : Chronological Age: [unfilled] [Alert] : alert [Vocalizes] : vocalizes [Consolable] : consolable [Social/Interactive] : social/interactive [Playful face to face inter  w/ people] : playful face to face interacts with people [[place] in resp to playful interaction] : coos in response to playful interaction [Head in midline] : head in midline [Hands to midline] : hands to midline [Moves extremities against gravity] : moves extremities against gravity [Chin tuck] : chin tuck [Swats at toy] : swats at toy [Reaches to grab toy both hands equally] : reaches to grab toy both hands equally [Turns head side to side] : turns head side to side [Picks up head and props on elbows] : picks up head and props on elbows [Assist] : prone to supine (2-5 months) - Assist [Good] : head control is good [Ribs] : at ribs [Gross Grasp] : gross grasp [Explores with mouth] : explores with mouth [Release] : release [Hands to mouth] : hands to mouth [>] : > [Tracking moving objects (4-7 months)] : tracking moving objects (4-7 months) [Loves] : loves [Enjoy variety of textures] : enjoys variety of textures [Enjoy musical toys] : enjoys musical toys [Uses hands to play w/ and explore toys] : uses hands to play with and explore toys [] : no [Sleeps well] : sleeps well [Maintains eye contact with family during palyful interaction] : maintains eye contact with family during playful interaction [Enjoys playful interaction with other] : enjoys playful interaction with others [Comforted by cuddling or parents touch] : comforted by cuddling or parents touch [Generally happy when all needs met] : generally happy when all needs are met [Enjoys variety of playful movement (swing, bouncing)] : enjoys variety of playful movement (swing, bouncing) [Prone] : prone [Sitting] : sitting [FreeTextEntry1] : ANA [FreeTextEntry2] : went to outpt PT 1x.  [FreeTextEntry5] : Decreased right lateral neck tilt [FreeTextEntry3] : Parents instructed on activities to promote midline orientation of UE in all positions.

## 2024-12-12 NOTE — ASSESSMENT
[FreeTextEntry1] : ALLEN ANTONIO is a 39.2 week gestation infant, now chronologic age 6 weeks seen in  follow-up. Pertinent NICU history includes Term infant, HIE S/P Therapeutic Hypothermia, Respiratory Failure, Metabolic Acidosis, Presumed Sepsis  The following issues were addressed at this visit.  Growth and nutrition: Weight gain has been 28 g.day and plots at [id number] percentile. Head growth and length are at the 19th and 18th percentiles respectively. Baby is currently directly breastfeeding or receiving EHM and the plan is to continue until the baby transitions to whole milk at ~ 1 year. Solid foods are not recommended until baby has good head control. No labs needed today. Continue vitamin supplements.  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 During her NICU stay her EEG showed mild-mod slowing of background activity but no sz. Her MRI showed patchy areas of recent ischemia involving the inferior parietal periventricular white matter. Saw Peds Neurology on 10/11,  next follow up in 2025. The baby has developmental delay for chronologic age, was seen by PT/OT today and given home exercises to do. Baby is developing appropriately as of now, does not need additional PT/OT. Last visit, Early Intervention was not recommended but patient saw outpatient PT, who gave exercises and stretches to do at home. Baby will follow-up with pediatric developmental on 24.  Anemia: Baby has not needed iron supplements. Hct reviewed and is appropriate for age.  CLD: Infant has no sx of chronic lung disease. O2 sats 100% in RA. The baby received RSV prophylaxis by PMD on 10/9/.24.  JUAN: Baby has no signs of JUAN.  ROP: Baby is not at risk for ROP.  Other: Health maintenance: Reviewed routine vaccination schedule with parent as well as guidance for flu vaccine for family, COVID-19 precautions, and need for PMD f/u. Also discussed bathing and skin care recommendations.   Reviewed Hospital notes and Discharge Summary.  Patient qualifies for Bert assessment at 1 year of age.

## 2024-12-12 NOTE — PHYSICAL EXAM
[Pink] : pink [Well Perfused] : well perfused [No Rashes] : no rashes [No Birth Marks] : no birth marks [Conjunctiva Clear] : conjunctiva clear [PERRL] : pupils were equal, round, reactive to light  [Ears Normal Position and Shape] : normal position and shape of ears [Nares Patent] : nares patent [No Nasal Flaring] : no nasal flaring [Moist and Pink Mucous Membranes] : moist and pink mucous membranes [Palate Intact] : palate intact [No Torticollis] : no torticollis [No Neck Masses] : no neck masses [Symmetric Expansion] : symmetric chest expansion [No Retractions] : no retractions [Clear to Auscultation] : lungs clear to auscultation  [Normal S1, S2] : normal S1 and S2 [Regular Rhythm] : regular rhythm [No Murmur] : no mumur [Normal Pulses] : normal pulses [Non Distended] : non distended [No HSM] : no hepatosplenomegaly appreciated [No Masses] : no masses were palpated [Normal Bowel Sounds] : normal bowel sounds [No Umbilical Hernia] : no umbilical hernia [Normal Genitalia] : normal genitalia [No Sacral Dimples] : no sacral dimples [No Scoliosis] : no scoliosis [Normal Range of Motion] : normal range of motion [Normal Posture] : normal posture [No evidence of Hip Dislocation] : no evidence of hip dislocation [Active and Alert] : active and alert [Normal muscle tone] : normal muscle tone of all extremites [Normal truncal tone] : normal truncal tone [Normal deep tendon reflexes] : normal deep tendon reflexes [No head lag] : no head lag [Symmetric Jeri] : the [place] reflex was ~L present [Strong Suck] : the strong sucking reflex was ~L present [Rooting] : the rooting reflex was ~L present [Fixes On Faces] : fixes on faces [Follows 180 Degrees] : visual track 180 degrees [Smiles Sociallly] : has a social smile [Lifts Head And Chest 45 degress in Prone] : lifts the head and chest 45 degress in prone [Weight Shifts in Prone] : weight shifts in prone [Sits With Support] : sits with support [Hands Open] : the hands open [Reaches for Objects] : reaches for objects [Transfers Objects] : transfers objects from hand to hand [Brings Hands to Mouth] : brings hands to mouth [Reaches For Objects in Prone] : does not reach for objects in prone

## 2024-12-12 NOTE — PHYSICAL EXAM
[Pink] : pink [Well Perfused] : well perfused [No Rashes] : no rashes [No Birth Marks] : no birth marks [Conjunctiva Clear] : conjunctiva clear [PERRL] : pupils were equal, round, reactive to light  [Ears Normal Position and Shape] : normal position and shape of ears [Nares Patent] : nares patent [No Nasal Flaring] : no nasal flaring [Moist and Pink Mucous Membranes] : moist and pink mucous membranes [Palate Intact] : palate intact [No Torticollis] : no torticollis [No Neck Masses] : no neck masses [Symmetric Expansion] : symmetric chest expansion [No Retractions] : no retractions [Clear to Auscultation] : lungs clear to auscultation  [Normal S1, S2] : normal S1 and S2 [Regular Rhythm] : regular rhythm [No Murmur] : no mumur [Normal Pulses] : normal pulses [Non Distended] : non distended [No HSM] : no hepatosplenomegaly appreciated [No Masses] : no masses were palpated [Normal Bowel Sounds] : normal bowel sounds [No Umbilical Hernia] : no umbilical hernia [Normal Genitalia] : normal genitalia [No Sacral Dimples] : no sacral dimples [No Scoliosis] : no scoliosis [Normal Range of Motion] : normal range of motion [Normal Posture] : normal posture [No evidence of Hip Dislocation] : no evidence of hip dislocation [Active and Alert] : active and alert [Normal muscle tone] : normal muscle tone of all extremites [Normal truncal tone] : normal truncal tone [Normal deep tendon reflexes] : normal deep tendon reflexes [No head lag] : no head lag [Symmetric Jeri] : the [place] reflex was ~L present [Strong Suck] : the strong sucking reflex was ~L present [Rooting] : the rooting reflex was ~L present [Fixes On Faces] : fixes on faces [Follows 180 Degrees] : visual track 180 degrees [Smiles Sociallly] : has a social smile [Lifts Head And Chest 45 degress in Prone] : lifts the head and chest 45 degress in prone [Weight Shifts in Prone] : weight shifts in prone [Sits With Support] : sits with support [Hands Open] : the hands open [Reaches for Objects] : reaches for objects [Transfers Objects] : transfers objects from hand to hand [Brings Hands to Mouth] : brings hands to mouth [Reaches For Objects in Prone] : does not reach for objects in prone

## 2024-12-12 NOTE — HISTORY OF PRESENT ILLNESS
[Gestational Age: ___] : Gestational Age: [unfilled] [Chronological Age: ___] : Chronological Age: [unfilled] [Date of D/C: ___] : Date of D/C: [unfilled] [Developmental Pediatrics: ___] : Developmental Pediatrics: [unfilled] [_____ Times Per] : Stool frequency occurs [unfilled] times per  [Day] : day [Soft] : soft [Formed] : formed [No Feeding Issues] : no feeding issues at this time [Bloody] : not bloody [Mucousy] : no mucous [de-identified] :  High Risk & Developmental follow up [de-identified] : none [de-identified] : Peds Neuro, follow up in April 25 [de-identified] : done [de-identified] : 70-120ml every 3-4 hours, throughout night as well. [de-identified] : n/a

## 2024-12-12 NOTE — BIRTH HISTORY
[de-identified] : Baby is a 39.2 wk female born to a 35 y/o  mother via unscheduled emergent C/S for fetal bradycardia for ~ 10 min with HR dropping to 90's with intermittent recovery but not sustained during pushing and elevated station of 0. Maternal history significant for chronic HTN on labetalol and amlodipine for which she was undergoing IOL. Prenatal history uncomplicated. Maternal blood type B+. PNL: HIV neg/RPR NR/HBsAg neg/rubella immune. GBS positive on , inadequate treatment with Cefazolin. AROM at 12:50 on , light mec fluids. ROM duration 16 HRS. Maternal temp 37.1C. EOS: 0.32. Baby born vertex, immediate cord clamping, poor tone, blue, apneic. Warmed, dried, stimulated without improvement and PPV started at 1 min of life. HR<60 at 1 MOL. PIP increased to 30 due to poor chest rise, PEEP of 6 up to 100% FiO2. HR improved 100 by 3 MOL, however due to poor respiratory effort, infant was intubated with 3.5 ETT by NICU fellow Dr. Coburn on the first attempt. Equal but coarse breath sounds noted. PIP weaned to 25 and FiO2 to 40% prior to leaving OR. Apgars 1/2/6/8.  [de-identified] : FT, AGA, respiratory failure, need for observation for sepsis, metabolic acidosis, and  encephalopathy

## 2024-12-12 NOTE — BIRTH HISTORY
[de-identified] : Baby is a 39.2 wk female born to a 33 y/o  mother via unscheduled emergent C/S for fetal bradycardia for ~ 10 min with HR dropping to 90's with intermittent recovery but not sustained during pushing and elevated station of 0. Maternal history significant for chronic HTN on labetalol and amlodipine for which she was undergoing IOL. Prenatal history uncomplicated. Maternal blood type B+. PNL: HIV neg/RPR NR/HBsAg neg/rubella immune. GBS positive on , inadequate treatment with Cefazolin. AROM at 12:50 on , light mec fluids. ROM duration 16 HRS. Maternal temp 37.1C. EOS: 0.32. Baby born vertex, immediate cord clamping, poor tone, blue, apneic. Warmed, dried, stimulated without improvement and PPV started at 1 min of life. HR<60 at 1 MOL. PIP increased to 30 due to poor chest rise, PEEP of 6 up to 100% FiO2. HR improved 100 by 3 MOL, however due to poor respiratory effort, infant was intubated with 3.5 ETT by NICU fellow Dr. Coburn on the first attempt. Equal but coarse breath sounds noted. PIP weaned to 25 and FiO2 to 40% prior to leaving OR. Apgars 1/2/6/8.  [de-identified] : FT, AGA, respiratory failure, need for observation for sepsis, metabolic acidosis, and  encephalopathy

## 2024-12-12 NOTE — ASSESSMENT
[FreeTextEntry1] : ALLEN ANTONIO is a 39.2 week gestation infant, now chronologic age 6 weeks seen in  follow-up. Pertinent NICU history includes Term infant, HIE S/P Therapeutic Hypothermia, Respiratory Failure, Metabolic Acidosis, Presumed Sepsis  The following issues were addressed at this visit.  Growth and nutrition: Weight gain has been 28 g.day and plots at cxi29of percentile. Head growth and length are at the 19th and 18th percentiles respectively. Baby is currently directly breastfeeding or receiving EHM and the plan is to continue until the baby transitions to whole milk at ~ 1 year. Solid foods are not recommended until baby has good head control. No labs needed today. Continue vitamin supplements.  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 During her NICU stay her EEG showed mild-mod slowing of background activity but no sz. Her MRI showed patchy areas of recent ischemia involving the inferior parietal periventricular white matter. Saw Peds Neurology on 10/11,  next follow up in 2025. The baby has developmental delay for chronologic age, was seen by PT/OT today and given home exercises to do. Baby is developing appropriately as of now, does not need additional PT/OT. Last visit, Early Intervention was not recommended but patient saw outpatient PT, who gave exercises and stretches to do at home. Baby will follow-up with pediatric developmental on 24.  Anemia: Baby has not needed iron supplements. Hct reviewed and is appropriate for age.  CLD: Infant has no sx of chronic lung disease. O2 sats 100% in RA. The baby received RSV prophylaxis by PMD on 10/9/.24.  JUAN: Baby has no signs of JUAN.  ROP: Baby is not at risk for ROP.  Other: Health maintenance: Reviewed routine vaccination schedule with parent as well as guidance for flu vaccine for family, COVID-19 precautions, and need for PMD f/u. Also discussed bathing and skin care recommendations.   Reviewed Hospital notes and Discharge Summary.  Patient qualifies for Bert assessment at 1 year of age.

## 2024-12-12 NOTE — BIRTH HISTORY
[de-identified] : Baby is a 39.2 wk female born to a 33 y/o  mother via unscheduled emergent C/S for fetal bradycardia for ~ 10 min with HR dropping to 90's with intermittent recovery but not sustained during pushing and elevated station of 0. Maternal history significant for chronic HTN on labetalol and amlodipine for which she was undergoing IOL. Prenatal history uncomplicated. Maternal blood type B+. PNL: HIV neg/RPR NR/HBsAg neg/rubella immune. GBS positive on , inadequate treatment with Cefazolin. AROM at 12:50 on , light mec fluids. ROM duration 16 HRS. Maternal temp 37.1C. EOS: 0.32. Baby born vertex, immediate cord clamping, poor tone, blue, apneic. Warmed, dried, stimulated without improvement and PPV started at 1 min of life. HR<60 at 1 MOL. PIP increased to 30 due to poor chest rise, PEEP of 6 up to 100% FiO2. HR improved 100 by 3 MOL, however due to poor respiratory effort, infant was intubated with 3.5 ETT by NICU fellow Dr. Coburn on the first attempt. Equal but coarse breath sounds noted. PIP weaned to 25 and FiO2 to 40% prior to leaving OR. Apgars 1/2/6/8.  [de-identified] : FT, AGA, respiratory failure, need for observation for sepsis, metabolic acidosis, and  encephalopathy

## 2024-12-12 NOTE — ASSESSMENT
[FreeTextEntry1] : ALLEN ANTONIO is a 39.2 week gestation infant, now chronologic age 6 weeks seen in  follow-up. Pertinent NICU history includes Term infant, HIE S/P Therapeutic Hypothermia, Respiratory Failure, Metabolic Acidosis, Presumed Sepsis  The following issues were addressed at this visit.  Growth and nutrition: Weight gain has been 28 g.day and plots at [id number] percentile. Head growth and length are at the 19th and 18th percentiles respectively. Baby is currently directly breastfeeding or receiving EHM and the plan is to continue until the baby transitions to whole milk at ~ 1 year. Solid foods are not recommended until baby has good head control. No labs needed today. Continue vitamin supplements.  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 During her NICU stay her EEG showed mild-mod slowing of background activity but no sz. Her MRI showed patchy areas of recent ischemia involving the inferior parietal periventricular white matter. Saw Peds Neurology on 10/11,  next follow up in 2025. The baby has developmental delay for chronologic age, was seen by PT/OT today and given home exercises to do. Baby is developing appropriately as of now, does not need additional PT/OT. Last visit, Early Intervention was not recommended but patient saw outpatient PT, who gave exercises and stretches to do at home. Baby will follow-up with pediatric developmental on 24.  Anemia: Baby has not needed iron supplements. Hct reviewed and is appropriate for age.  CLD: Infant has no sx of chronic lung disease. O2 sats 100% in RA. The baby received RSV prophylaxis by PMD on 10/9/.24.  JUAN: Baby has no signs of JUAN.  ROP: Baby is not at risk for ROP.  Other: Health maintenance: Reviewed routine vaccination schedule with parent as well as guidance for flu vaccine for family, COVID-19 precautions, and need for PMD f/u. Also discussed bathing and skin care recommendations.   Reviewed Hospital notes and Discharge Summary.  Patient qualifies for Bert assessment at 1 year of age.

## 2024-12-12 NOTE — DISCUSSION/SUMMARY
[GA at Birth: ___] : GA at Birth: [unfilled] [Chronological Age: ___] : Chronological Age: [unfilled] [Alert] : alert [Vocalizes] : vocalizes [Consolable] : consolable [Social/Interactive] : social/interactive [Playful face to face inter  w/ people] : playful face to face interacts with people [[place] in resp to playful interaction] : coos in response to playful interaction [Head in midline] : head in midline [Hands to midline] : hands to midline [Moves extremities against gravity] : moves extremities against gravity [Chin tuck] : chin tuck [Swats at toy] : swats at toy [Reaches to grab toy both hands equally] : reaches to grab toy both hands equally [Turns head side to side] : turns head side to side [Picks up head and props on elbows] : picks up head and props on elbows [Assist] : prone to supine (2-5 months) - Assist [Good] : head control is good [Ribs] : at ribs [Gross Grasp] : gross grasp [Explores with mouth] : explores with mouth [Release] : release [Hands to mouth] : hands to mouth [>] : > [Tracking moving objects (4-7 months)] : tracking moving objects (4-7 months) [Loves] : loves [Enjoy variety of textures] : enjoys variety of textures [Enjoy musical toys] : enjoys musical toys [Uses hands to play w/ and explore toys] : uses hands to play with and explore toys [] : no [Sleeps well] : sleeps well [Maintains eye contact with family during palyful interaction] : maintains eye contact with family during playful interaction [Enjoys playful interaction with other] : enjoys playful interaction with others [Comforted by cuddling or parents touch] : comforted by cuddling or parents touch [Generally happy when all needs met] : generally happy when all needs are met [Enjoys variety of playful movement (swing, bouncing)] : enjoys variety of playful movement (swing, bouncing) [Prone] : prone [Sitting] : sitting [FreeTextEntry1] : ANA [FreeTextEntry2] : went to outpt PT 1x.  [FreeTextEntry5] : Decreased right lateral neck tilt [FreeTextEntry3] : Parents instructed on activities to promote midline orientation of UE in all positions.

## 2024-12-12 NOTE — CONSULT LETTER
[Dear  ___] : Dear  [unfilled], [Courtesy Letter:] : I had the pleasure of seeing your patient, [unfilled], in my office today. [Please see my note below.] : Please see my note below. [Sincerely,] : Sincerely, [FreeTextEntry3] : Bertha Cee MD Attending Neonatologist [facility]

## 2024-12-12 NOTE — PATIENT INSTRUCTIONS
[Verbal patient instructions provided] : Verbal patient instructions provided. [de-identified] : Eligible for beyfortus Fall 2024, please discuss with PMD. [FreeTextEntry1] : Developmental Clinic appt   2/19/25     phone: [phone] Neuro f/u April 2025 [FreeTextEntry4] : Continue vitamin D. No solids until 5-6 months [FreeTextEntry6] : n/a [FreeTextEntry7] : n/a [FreeTextEntry8] : per PMD [FreeTextEntry9] : n/a [de-identified] : skin care instructions reviewed with caregiver, aquaphor to skin, avoid direct sun exposure

## 2024-12-12 NOTE — DISCUSSION/SUMMARY
[GA at Birth: ___] : GA at Birth: [unfilled] [Chronological Age: ___] : Chronological Age: [unfilled] [Alert] : alert [Vocalizes] : vocalizes [Consolable] : consolable [Social/Interactive] : social/interactive [Playful face to face inter  w/ people] : playful face to face interacts with people [[place] in resp to playful interaction] : coos in response to playful interaction [Head in midline] : head in midline [Hands to midline] : hands to midline [Moves extremities against gravity] : moves extremities against gravity [Chin tuck] : chin tuck [Swats at toy] : swats at toy [Reaches to grab toy both hands equally] : reaches to grab toy both hands equally [Turns head side to side] : turns head side to side [Picks up head and props on elbows] : picks up head and props on elbows [Assist] : prone to supine (2-5 months) - Assist [Good] : head control is good [Ribs] : at ribs [Gross Grasp] : gross grasp [Explores with mouth] : explores with mouth [Release] : release [Hands to mouth] : hands to mouth [>] : > [Tracking moving objects (4-7 months)] : tracking moving objects (4-7 months) [Loves] : loves [Enjoy variety of textures] : enjoys variety of textures [Enjoy musical toys] : enjoys musical toys [Uses hands to play w/ and explore toys] : uses hands to play with and explore toys [] : no [Sleeps well] : sleeps well [Maintains eye contact with family during palyful interaction] : maintains eye contact with family during playful interaction [Enjoys playful interaction with other] : enjoys playful interaction with others [Comforted by cuddling or parents touch] : comforted by cuddling or parents touch [Generally happy when all needs met] : generally happy when all needs are met [Enjoys variety of playful movement (swing, bouncing)] : enjoys variety of playful movement (swing, bouncing) [Prone] : prone [Sitting] : sitting [FreeTextEntry1] : ANA [FreeTextEntry2] : went to outpt PT 1x.  [FreeTextEntry5] : Decreased right lateral neck tilt [FreeTextEntry3] : Parents instructed on activities to promote midline orientation of UE in all positions.

## 2024-12-12 NOTE — CONSULT LETTER
[Dear  ___] : Dear  [unfilled], [Courtesy Letter:] : I had the pleasure of seeing your patient, [unfilled], in my office today. [Please see my note below.] : Please see my note below. [Sincerely,] : Sincerely, [FreeTextEntry3] : Bertha Cee MD Attending Neonatologist [facility]

## 2024-12-12 NOTE — PATIENT INSTRUCTIONS
[Verbal patient instructions provided] : Verbal patient instructions provided. [de-identified] : Eligible for beyfortus Fall 2024, please discuss with PMD. [FreeTextEntry1] : Developmental Clinic appt   2/19/25     phone: [phone] Neuro f/u April 2025 [FreeTextEntry4] : Continue vitamin D. No solids until 5-6 months [FreeTextEntry6] : n/a [FreeTextEntry7] : n/a [FreeTextEntry8] : per PMD [FreeTextEntry9] : n/a [de-identified] : skin care instructions reviewed with caregiver, aquaphor to skin, avoid direct sun exposure

## 2024-12-12 NOTE — BIRTH HISTORY
[de-identified] : Baby is a 39.2 wk female born to a 35 y/o  mother via unscheduled emergent C/S for fetal bradycardia for ~ 10 min with HR dropping to 90's with intermittent recovery but not sustained during pushing and elevated station of 0. Maternal history significant for chronic HTN on labetalol and amlodipine for which she was undergoing IOL. Prenatal history uncomplicated. Maternal blood type B+. PNL: HIV neg/RPR NR/HBsAg neg/rubella immune. GBS positive on , inadequate treatment with Cefazolin. AROM at 12:50 on , light mec fluids. ROM duration 16 HRS. Maternal temp 37.1C. EOS: 0.32. Baby born vertex, immediate cord clamping, poor tone, blue, apneic. Warmed, dried, stimulated without improvement and PPV started at 1 min of life. HR<60 at 1 MOL. PIP increased to 30 due to poor chest rise, PEEP of 6 up to 100% FiO2. HR improved 100 by 3 MOL, however due to poor respiratory effort, infant was intubated with 3.5 ETT by NICU fellow Dr. Coburn on the first attempt. Equal but coarse breath sounds noted. PIP weaned to 25 and FiO2 to 40% prior to leaving OR. Apgars 1/2/6/8.  [de-identified] : FT, AGA, respiratory failure, need for observation for sepsis, metabolic acidosis, and  encephalopathy

## 2024-12-12 NOTE — PHYSICAL EXAM
[Pink] : pink [Well Perfused] : well perfused [No Rashes] : no rashes [No Birth Marks] : no birth marks [Conjunctiva Clear] : conjunctiva clear [PERRL] : pupils were equal, round, reactive to light  [Ears Normal Position and Shape] : normal position and shape of ears [Nares Patent] : nares patent [No Nasal Flaring] : no nasal flaring [Moist and Pink Mucous Membranes] : moist and pink mucous membranes [Palate Intact] : palate intact [No Torticollis] : no torticollis [No Neck Masses] : no neck masses [Symmetric Expansion] : symmetric chest expansion [No Retractions] : no retractions [Clear to Auscultation] : lungs clear to auscultation  [Normal S1, S2] : normal S1 and S2 [Regular Rhythm] : regular rhythm [No Murmur] : no mumur [Normal Pulses] : normal pulses [Non Distended] : non distended [No HSM] : no hepatosplenomegaly appreciated [No Masses] : no masses were palpated [Normal Bowel Sounds] : normal bowel sounds [No Umbilical Hernia] : no umbilical hernia [Normal Genitalia] : normal genitalia [No Sacral Dimples] : no sacral dimples [No Scoliosis] : no scoliosis [Normal Range of Motion] : normal range of motion [Normal Posture] : normal posture [No evidence of Hip Dislocation] : no evidence of hip dislocation [Active and Alert] : active and alert [Normal muscle tone] : normal muscle tone of all extremites [Normal truncal tone] : normal truncal tone [Normal deep tendon reflexes] : normal deep tendon reflexes [No head lag] : no head lag [Symmetric Jeri] : the [place] reflex was ~L present [Strong Suck] : the strong sucking reflex was ~L present [Rooting] : the rooting reflex was ~L present [Fixes On Faces] : fixes on faces [Follows 180 Degrees] : visual track 180 degrees [Smiles Sociallly] : has a social smile [Lifts Head And Chest 45 degress in Prone] : lifts the head and chest 45 degress in prone [Weight Shifts in Prone] : weight shifts in prone [Sits With Support] : sits with support [Hands Open] : the hands open [Reaches for Objects] : reaches for objects [Transfers Objects] : transfers objects from hand to hand [Brings Hands to Mouth] : brings hands to mouth [Reaches For Objects in Prone] : does not reach for objects in prone

## 2024-12-12 NOTE — REVIEW OF SYSTEMS
[Fatigue] : no fatigue [Decreased Appetite] : no decrease in appetite [Eye Discharge] : no eye discharge [Eye Redness] : no redness [Rhinorrhea] : no rhinorrhea [Nasal Congestion] : no nasal congestion [Edema] : no edema [Difficulty Breathing] : no dyspnea [Cough] : no cough [Vomiting] : no vomiting [Diarrhea] : no diarrhea [Abnormal Movements] : no abnormal movements [Atopic Dermatitis] : no atopic dermatitis [Swelling] : no swelling [Rash] : no rash [Blood in Stools] : no blood in stools [Skin Rash] : no skin rash

## 2025-02-25 NOTE — PHYSICAL EXAM
[Roll Supine to Prone] : rolls supine to prone [Sits With Arm Support] : sits with arm support [Crawl] : crawls  [Finger Feeding] : finger feeding  [Creep] : creeps [Unfisted] : unfisted [Manipulates Fingers] : manipulates fingers [Transfer] : transfers objects [Alert To Sounds] : alert to sounds [Social Smile] : has a social smile [Orients To Voice] : orients to voice [Laughs Aloud] : laughs aloud ["Tiffanie Bradford"] : tiffanie bateman [Razzing] : razzing [Normal] : awake and interactive, normal strength tone throughout. [Spoon] : does not use a spoon [Gesture Language] : does not gesture language [Babbling] : does not babble [de-identified] :  soft, non tender, non distended [de-identified] : Good eye contact, responsive social smile

## 2025-02-25 NOTE — PLAN
[TextEntry] : F/u 6 months [No delays noted, anticipatory developmental guidance given.] : No delays noted, anticipatory developmental guidance given.  [Safety counseling given regarding major safety issues for children this age.] : Safety counseling given regarding major safety issues for children this age. [Baby proofing discussed, socket plugs, cord and cable safety, tablecloth-removal.] : Baby proofing discussed, socket plugs, cord and cable safety, tablecloth-removal. [All medications should be stored in a child proof container out of reach of the child.] : All medications should be stored in a child proof container out of reach of the child.  [Reading daily was encouraged.] : Reading daily was encouraged.  [Avoid choking hazards such as peanuts, hot dogs, un-cut grapes, hot dogs, peanut butter, fruits with skins and balloons.] : Avoid choking hazards such as peanuts, hot dogs, un-cut grapes, hot dogs, peanut butter, fruits with skins and balloons.  [Poison control number given in case of emergencies. [phone].] : Poison control number given in case of emergencies. [phone]. [FreeTextEntry1] : Consider ENT if noises gets worse or appears to be causing her distress F/u with neurology as instructed F/u 6 months

## 2025-02-25 NOTE — FAMILY HISTORY
[TextEntry] :  Denies Family History of cardiac arrythmias, congenital cardiac problems. Denies family history of speech delay, autism, learning disability, ADHD, seizures, mental health issues, depression, schizophrenia  HTN on both maternal and paternal sides of the family Mother murmur as child  Maternal Great Aunt: Breast Cancer

## 2025-02-25 NOTE — REASON FOR VISIT
[Initial Visit] : an initial visit for [Mother] : mother [FreeTextEntry3] :  Developmental follow up 2/2 HIE s/p 72hrs cooling. Due to patient age, caregiver history required for comprehensive evaluation. Patient was referred from the primary medical team for enhanced developmental surveillance because of medical history.

## 2025-02-25 NOTE — BIRTH HISTORY
[At ___ Weeks Gestation] : at [unfilled] weeks gestation [ Section] : by  section [de-identified] :  unscheduled emergent Csection for fetal bradycardia for ~10mins.  [FreeTextEntry1] : 2.62kg [FreeTextEntry4] : Abnormal FHT, meconium stained fluid PROM   [FreeTextEntry3] : APGARS 1 2 6 8. Intubated in delivery room   NICU 7d stay complicated by Respiratory failure s/p SIMV, Therapeutic hypothermia 8/27-8/30 Tongue thrusting and rhythmic mandibular movement noted on admission to NICU VEEG with mild to moderate slowing of background activity   Maternal hx significant for chronic HTN on labetalol and amlodipine

## 2025-02-25 NOTE — SOCIAL HISTORY
[TextEntry] : Lives with parents in [place] some time Lives with mother, maternal grandparents and maternal aunt 95% of the time in [place] No pets, or smoking in either household  Father: Internal medicine resident Mother: Nurse

## 2025-02-25 NOTE — HISTORY OF PRESENT ILLNESS
[___ Times/day] : [unfilled] times/day [___ ounces/feeding] : ~LEANNA buck/feeding [Gestational Age: ___] : Gestational Age in Weeks: [unfilled] [Chronological Age: ___] : Chronological Age in Months: [unfilled] [Neurology: ___] : Neurology: [unfilled] [None] : none [de-identified] : Mother states that Asa makes weird noises when she is excited. She is not in any distress, pain or appear uncomfortable. Lasts for a few seconds to minutes.  [de-identified] : MRI/DWI spectroscopy on Day 6 9/2 patchy small subcentimeter areas of recent ischemia invlve the inferior parietal periventricular white matter. Patchy areas of subacute petechial hemoorhagic transformation involve the areas of ischemia without evidence for hematoma formation  [de-identified] : excessive sharply contoured rhythmic delta activity frontally predominant excessive for GA.   [de-identified] : Feeds every 2 hrs during the night [FreeTextEntry3] : Weaning off of breast feeding to formula and breast milk in the bottle.  [de-identified] : Introduced purees: oatmeal, fruits, vegetables. Has not introduced eggs or nuts as yet [FreeTextEntry4] : Stools every other day. Utilizes pureed prunes and various vegetables to help her go [de-identified] : Wakes up to feed every 2 hrs in the night. Sleeps in 2 hr intervals. Takes 2-4hr naps 2-3x/day.

## 2025-02-25 NOTE — REVIEW OF SYSTEMS
[Fever] : no fever [Eye Discharge] : no discharge [Rhinorrhea] : no rhinorrhea [Cough] : no cough [Vomiting] : no vomiting [Diarrhea] : no diarrhea [Constipation] : no constipation [Seizure] : no seizures [Rash] : no rash

## 2025-02-28 NOTE — END OF VISIT
[Time Spent: ___ minutes] : I have spent [unfilled] minutes of time on the encounter which excludes teaching and separately reported services.
[Time Spent: ___ minutes] : I have spent [unfilled] minutes of time on the encounter which excludes teaching and separately reported services.
No. BLANCA screening performed.  STOP BANG Legend: 0-2 = LOW Risk; 3-4 = INTERMEDIATE Risk; 5-8 = HIGH Risk

## 2025-04-09 NOTE — ASSESSMENT
[FreeTextEntry1] : Asa is an adorable 7 month old girl with history of mild HIE s/p cooling here for follow up.

## 2025-04-09 NOTE — REASON FOR VISIT
[Medical Records] : medical records [Follow-Up Evaluation] : a follow-up evaluation for [Mother] : mother

## 2025-04-09 NOTE — DEVELOPMENTAL MILESTONES
[Can suck, swallow and breathe easily] : can suck, swallow and breathe easily [Follows your face] : follows your face [Turns and calms to your voice] : turns and calms to your voice [Eats well] : eats well [Feeds self] : feeds self [Uses verbal exploration] : uses verbal exploration [Uses oral exploration] : uses oral exploration [Beginning to recognize own name] : beginning to recognize own name [Enjoys vocal turn taking] : enjoys vocal turn taking [Shows pleasure from interactions with others] : shows pleasure from interactions with others [Passes objects] : passes objects [Rakes objects] : rakes objects [Jacob] : jacob [Combines syllables] : combines syllables [Michael/Mama non-specific] : michael/mama non-specific [Imitate speech/sounds] : imitate speech/sounds [Single syllables (ah,eh,oh)] : single syllables (ah,eh,oh) [Spontaneous Excessive Babbling] : spontaneous excessive babbling [Turns to voices] : turns to voices [Sit - no support, leaning forward] : sit - no support, leaning forward [Pulls to sit - no head lag] : pulls to sit - no head lag [Roll over] : roll over

## 2025-04-09 NOTE — PLAN
[FreeTextEntry1] : She is meeting developmental milestones Neurological exam is normal Reassurance provided F/u 6 months

## 2025-04-09 NOTE — HISTORY OF PRESENT ILLNESS
[FreeTextEntry1] : ALLEN ANTONIO is a 7 month old girl with history of HIE s/p cooling here for follow up. Last seen 24.  Interval history: She is making developmental progress.  Mom raised concern today that she does not always respond to her name. She is babbling, sitting independently, feeding herself and very social Does not need therapies  To review, she was born at 39.2 wk female born to a 33 y/o  mother via unscheduled emergent C/S for fetal bradycardia for ~ 10 min with HR dropping to 90's with intermittent recovery but not sustained during pushing and elevated station of 0. Maternal history significant for chronic HTN on labetalol and amlodipine for which she was undergoing IOL. Prenatal history uncomplicated. Maternal blood type B+. PNL: HIV neg/RPR NR/HBsAg neg/rubella immune. GBS positive on , inadequate treatment with Cefazolin. AROM at 12:50 on , light mec fluids. ROM duration 16 HRS. Baby born vertex, immediate cord clamping, poor tone, blue, apneic. Warmed, dried, stimulated without improvement and PPV started at 1 min of life.  Apgars 1/2/6/8. Baby noted to be hypotonic with borderline metabolic acidosis on cord gases (7.00/-12.9 on arterial and 7.06/-12.2 on venous).  On admission to NICU noted to have tongue thrusting and rhythmic mandibular movements concerning for possible seizure.  VEEG showed excessive sharp delta and suppressed IBI (mild-moderate cerebral dysfunction), no seizures. MRI Head and Spec () showed patchy small subcentimeter areas of recent ischemia involve the inferior parietal periventricular white matter. Patchy areas of subacute petechial hemorrhagic transformation involve the areas of ischemia, without evidence for hematoma formation.

## 2025-04-09 NOTE — CONSULT LETTER
[Dear  ___] : Dear  [unfilled], [Consult Letter:] : I had the pleasure of evaluating your patient, [unfilled]. [Please see my note below.] : Please see my note below. [Consult Closing:] : Thank you very much for allowing me to participate in the care of this patient.  If you have any questions, please do not hesitate to contact me. [Sincerely,] : Sincerely, [FreeTextEntry3] : Melisa Candelaria MD Child Neurologist [address], [address] [place] [address] Phone: [phone]

## 2025-05-30 NOTE — ASSESSMENT
[FreeTextEntry1] : Counseled mom regarding results obtained today, she expressed understanding of all.

## 2025-05-30 NOTE — HISTORY OF PRESENT ILLNESS
[FreeTextEntry1] : 9 month old referred for audiological evaluation to monitor hearing due to NICU stay. Hx of HIE and head cooling. Passed [facility]HS bilaterally prior to discharge. Asa is doing well, mom believes she attends to sound normally.

## 2025-05-30 NOTE — PROCEDURE
[Normal Cochlear] : consistent with normal cochlear outer hair cell function  [OAE Present (Left)] : otoacoustic emissions present left ear [OAE Present (Right)] : otoacoustic emissions present right ear [] : Acoustic Immittance: [Type A Tympanogram] : Type A Normal [VRA] : Visual Reinforcement Audiometry [Good] : good [de-identified] : Hearing within normal limits at 500 Hz in at least the better hearing ear. Speech detection threshold agreed with tonal findings. Pt fatigued for further testing.